# Patient Record
Sex: FEMALE | Race: WHITE
[De-identification: names, ages, dates, MRNs, and addresses within clinical notes are randomized per-mention and may not be internally consistent; named-entity substitution may affect disease eponyms.]

---

## 2017-07-14 ENCOUNTER — HOSPITAL ENCOUNTER (INPATIENT)
Dept: HOSPITAL 17 - PHED | Age: 72
LOS: 1 days | Discharge: HOME | DRG: 309 | End: 2017-07-15
Attending: HOSPITALIST | Admitting: HOSPITALIST
Payer: MEDICARE

## 2017-07-14 VITALS
OXYGEN SATURATION: 100 % | HEART RATE: 50 BPM | SYSTOLIC BLOOD PRESSURE: 103 MMHG | RESPIRATION RATE: 16 BRPM | DIASTOLIC BLOOD PRESSURE: 51 MMHG

## 2017-07-14 VITALS
DIASTOLIC BLOOD PRESSURE: 59 MMHG | RESPIRATION RATE: 16 BRPM | SYSTOLIC BLOOD PRESSURE: 91 MMHG | HEART RATE: 44 BPM | OXYGEN SATURATION: 98 %

## 2017-07-14 VITALS
OXYGEN SATURATION: 98 % | RESPIRATION RATE: 16 BRPM | SYSTOLIC BLOOD PRESSURE: 85 MMHG | DIASTOLIC BLOOD PRESSURE: 46 MMHG | TEMPERATURE: 97.6 F | HEART RATE: 35 BPM

## 2017-07-14 VITALS
HEART RATE: 48 BPM | RESPIRATION RATE: 16 BRPM | SYSTOLIC BLOOD PRESSURE: 86 MMHG | DIASTOLIC BLOOD PRESSURE: 54 MMHG | OXYGEN SATURATION: 94 %

## 2017-07-14 VITALS
OXYGEN SATURATION: 98 % | DIASTOLIC BLOOD PRESSURE: 44 MMHG | HEART RATE: 44 BPM | RESPIRATION RATE: 18 BRPM | SYSTOLIC BLOOD PRESSURE: 93 MMHG

## 2017-07-14 VITALS
DIASTOLIC BLOOD PRESSURE: 44 MMHG | HEART RATE: 46 BPM | OXYGEN SATURATION: 98 % | RESPIRATION RATE: 14 BRPM | SYSTOLIC BLOOD PRESSURE: 93 MMHG

## 2017-07-14 VITALS — BODY MASS INDEX: 28.87 KG/M2 | HEIGHT: 64 IN | WEIGHT: 169.09 LBS

## 2017-07-14 VITALS
HEART RATE: 35 BPM | RESPIRATION RATE: 16 BRPM | DIASTOLIC BLOOD PRESSURE: 43 MMHG | SYSTOLIC BLOOD PRESSURE: 79 MMHG | OXYGEN SATURATION: 100 %

## 2017-07-14 VITALS
DIASTOLIC BLOOD PRESSURE: 47 MMHG | OXYGEN SATURATION: 99 % | RESPIRATION RATE: 14 BRPM | HEART RATE: 46 BPM | SYSTOLIC BLOOD PRESSURE: 94 MMHG

## 2017-07-14 VITALS
HEART RATE: 48 BPM | SYSTOLIC BLOOD PRESSURE: 107 MMHG | TEMPERATURE: 98 F | RESPIRATION RATE: 12 BRPM | DIASTOLIC BLOOD PRESSURE: 63 MMHG | OXYGEN SATURATION: 96 %

## 2017-07-14 VITALS
DIASTOLIC BLOOD PRESSURE: 47 MMHG | RESPIRATION RATE: 14 BRPM | SYSTOLIC BLOOD PRESSURE: 80 MMHG | OXYGEN SATURATION: 98 % | HEART RATE: 44 BPM

## 2017-07-14 VITALS
SYSTOLIC BLOOD PRESSURE: 97 MMHG | RESPIRATION RATE: 14 BRPM | HEART RATE: 40 BPM | DIASTOLIC BLOOD PRESSURE: 48 MMHG | OXYGEN SATURATION: 97 %

## 2017-07-14 VITALS — OXYGEN SATURATION: 95 %

## 2017-07-14 VITALS
HEART RATE: 48 BPM | OXYGEN SATURATION: 96 % | TEMPERATURE: 97.7 F | DIASTOLIC BLOOD PRESSURE: 49 MMHG | SYSTOLIC BLOOD PRESSURE: 113 MMHG | RESPIRATION RATE: 16 BRPM

## 2017-07-14 VITALS
DIASTOLIC BLOOD PRESSURE: 60 MMHG | OXYGEN SATURATION: 98 % | SYSTOLIC BLOOD PRESSURE: 108 MMHG | RESPIRATION RATE: 14 BRPM | HEART RATE: 48 BPM

## 2017-07-14 VITALS
SYSTOLIC BLOOD PRESSURE: 115 MMHG | DIASTOLIC BLOOD PRESSURE: 52 MMHG | RESPIRATION RATE: 16 BRPM | OXYGEN SATURATION: 96 % | HEART RATE: 46 BPM

## 2017-07-14 DIAGNOSIS — I10: ICD-10-CM

## 2017-07-14 DIAGNOSIS — D64.9: ICD-10-CM

## 2017-07-14 DIAGNOSIS — N17.9: ICD-10-CM

## 2017-07-14 DIAGNOSIS — R73.9: ICD-10-CM

## 2017-07-14 DIAGNOSIS — E86.0: ICD-10-CM

## 2017-07-14 DIAGNOSIS — E66.9: ICD-10-CM

## 2017-07-14 DIAGNOSIS — I95.9: ICD-10-CM

## 2017-07-14 DIAGNOSIS — R25.1: ICD-10-CM

## 2017-07-14 DIAGNOSIS — Z98.84: ICD-10-CM

## 2017-07-14 DIAGNOSIS — Z79.82: ICD-10-CM

## 2017-07-14 DIAGNOSIS — R00.1: Primary | ICD-10-CM

## 2017-07-14 DIAGNOSIS — K22.70: ICD-10-CM

## 2017-07-14 LAB
ALP SERPL-CCNC: 142 U/L (ref 45–117)
ALT SERPL-CCNC: 53 U/L (ref 10–53)
ANION GAP SERPL CALC-SCNC: 12 MEQ/L (ref 5–15)
APTT BLD: 25.1 SEC (ref 24.3–30.1)
AST SERPL-CCNC: 91 U/L (ref 15–37)
BASOPHILS # BLD AUTO: 0 TH/MM3 (ref 0–0.2)
BASOPHILS NFR BLD: 0.2 % (ref 0–2)
BILIRUB SERPL-MCNC: 0.8 MG/DL (ref 0.2–1)
BUN SERPL-MCNC: 32 MG/DL (ref 7–18)
CHLORIDE SERPL-SCNC: 106 MEQ/L (ref 98–107)
CK SERPL-CCNC: 70 U/L (ref 26–192)
CK SERPL-CCNC: 81 U/L (ref 26–192)
EOSINOPHIL # BLD: 0.2 TH/MM3 (ref 0–0.4)
EOSINOPHIL NFR BLD: 2.6 % (ref 0–4)
ERYTHROCYTE [DISTWIDTH] IN BLOOD BY AUTOMATED COUNT: 13.7 % (ref 11.6–17.2)
GFR SERPLBLD BASED ON 1.73 SQ M-ARVRAT: 26 ML/MIN (ref 89–?)
HCO3 BLD-SCNC: 20.3 MEQ/L (ref 21–32)
HCT VFR BLD CALC: 28.2 % (ref 35–46)
HEMO FLAGS: (no result)
INR PPP: 1 RATIO
LACTIC ACID GHOST: (no result)
LYMPHOCYTES # BLD AUTO: 0.8 TH/MM3 (ref 1–4.8)
LYMPHOCYTES NFR BLD AUTO: 9.8 % (ref 9–44)
MAGNESIUM SERPL-MCNC: 1.7 MG/DL (ref 1.5–2.5)
MCH RBC QN AUTO: 31.5 PG (ref 27–34)
MCHC RBC AUTO-ENTMCNC: 32.3 % (ref 32–36)
MCV RBC AUTO: 97.6 FL (ref 80–100)
MONOCYTES NFR BLD: 5.6 % (ref 0–8)
NEUTROPHILS # BLD AUTO: 7.2 TH/MM3 (ref 1.8–7.7)
NEUTROPHILS NFR BLD AUTO: 81.8 % (ref 16–70)
PLATELET # BLD: 295 TH/MM3 (ref 150–450)
POTASSIUM SERPL-SCNC: 5.1 MEQ/L (ref 3.5–5.1)
PROTHROMBIN TIME: 10.6 SEC (ref 9.8–11.6)
RBC # BLD AUTO: 2.89 MIL/MM3 (ref 4–5.3)
SODIUM SERPL-SCNC: 138 MEQ/L (ref 136–145)
WBC # BLD AUTO: 8.7 TH/MM3 (ref 4–11)

## 2017-07-14 PROCEDURE — 80053 COMPREHEN METABOLIC PANEL: CPT

## 2017-07-14 PROCEDURE — 85025 COMPLETE CBC W/AUTO DIFF WBC: CPT

## 2017-07-14 PROCEDURE — 96374 THER/PROPH/DIAG INJ IV PUSH: CPT

## 2017-07-14 PROCEDURE — 85730 THROMBOPLASTIN TIME PARTIAL: CPT

## 2017-07-14 PROCEDURE — 87040 BLOOD CULTURE FOR BACTERIA: CPT

## 2017-07-14 PROCEDURE — 83735 ASSAY OF MAGNESIUM: CPT

## 2017-07-14 PROCEDURE — 82550 ASSAY OF CK (CPK): CPT

## 2017-07-14 PROCEDURE — 93005 ELECTROCARDIOGRAM TRACING: CPT

## 2017-07-14 PROCEDURE — 83690 ASSAY OF LIPASE: CPT

## 2017-07-14 PROCEDURE — 96375 TX/PRO/DX INJ NEW DRUG ADDON: CPT

## 2017-07-14 PROCEDURE — 71010: CPT

## 2017-07-14 PROCEDURE — 83605 ASSAY OF LACTIC ACID: CPT

## 2017-07-14 PROCEDURE — 84484 ASSAY OF TROPONIN QUANT: CPT

## 2017-07-14 PROCEDURE — 85610 PROTHROMBIN TIME: CPT

## 2017-07-14 PROCEDURE — 76775 US EXAM ABDO BACK WALL LIM: CPT

## 2017-07-14 PROCEDURE — 83880 ASSAY OF NATRIURETIC PEPTIDE: CPT

## 2017-07-14 RX ADMIN — HEPARIN SODIUM SCH UNITS: 10000 INJECTION, SOLUTION INTRAVENOUS; SUBCUTANEOUS at 21:56

## 2017-07-14 RX ADMIN — STANDARDIZED SENNA CONCENTRATE AND DOCUSATE SODIUM SCH TAB: 8.6; 5 TABLET, FILM COATED ORAL at 21:00

## 2017-07-14 RX ADMIN — Medication SCH ML: at 21:00

## 2017-07-14 RX ADMIN — PHENYTOIN SODIUM SCH MLS/HR: 50 INJECTION INTRAMUSCULAR; INTRAVENOUS at 18:25

## 2017-07-14 NOTE — HHI.HP
cc:   India Red MD


__________________________________________________





[facility]


Service


[facility] Hospitalists


Primary Care Physician


India Red MD


Admission Diagnosis


Symptomatic Bradycardia


Diagnoses:  


Chief Complaint:  


dizziness


Travel History


International Travel<30 Days:  No


Contact w/Intl Traveler <30 Da:  No


Traveled to Known Affected Are:  No


History of Present Illness


This is a 71-year-old female with past medical history significant for 

protection presents to [facility] complaining of dizziness.  The 

patient states that she had been recently started on verapamil efforts to 

control her blood pressure.  The patient states that this morning she started 

feeling dizzy and sweating profusely.  The patient however denies any chest pain

, palpitations, fevers, does complain of chills.  Patient said that she had dry 

heaving this morning.  Otherwise denies dysuria, abdominal pain.  The patient 

states that laying down somewhat her dizziness improved and standing up makes 

it worse.  The patient was in the emergency department and evaluated.  Found to 

be profoundly bradycardic and as per ED physician report given IV atropine 

which improved the patient's heart rate, patient also was given IV fluids, 

glucagon and calcium channel blocker was being administered during this 

interview.  A shunt also was found to be hypotensive with a systolic blood 

pressure in the 80s which has improved after being given IV fluids.





Review of Systems


As per history of present illness, other systems reviewed by me and negative





Past Family Social History


Past Medical History


Hypertension


Leija's esophagus


Past Surgical History


Bunionectomy


Adenoidectomy


Blepharoplasty


Reported Medications





Last Impressions








Chest X-Ray 7/14/17 1621 Signed





Impressions: 





 Service Date/Time:  Friday, July 14, 2017 16:38 - CONCLUSION:  No acute 





 cardiopulmonary disease.     RITA Santillan MD 





Reviewed by me


Allergies:  


Coded Allergies:  


     No Known Allergies (Unverified , 7/14/17)


Active Ordered Medications





 Current Medications








 Medications


  (Trade)  Dose


 Ordered  Sig/Alyssa


 Route  Start Time


 Stop Time Status Last Admin


 


 Sodium Chloride 2


 ml  2 ml  UNSCH  PRN


 IVF  7/14/17 16:30


     


 


 


  (NS 1000 ml Inj)  1,000 ml @ 


 100 mls/hr  Q10H


 IV  7/14/17 17:25


     


 


 


  (NS Flush)  2 ml  UNSCH  PRN


 IV FLUSH  7/14/17 17:30


     


 


 


  (NS Flush)  2 ml  BID


 IV FLUSH  7/14/17 21:00


     


 


 


  (Tylenol)  650 mg  Q4H  PRN


 PO  7/14/17 18:00


     


 


 


  (Zofran Inj)  4 mg  Q6H  PRN


 IVP  7/14/17 18:00


     


 


 


  (Heparin Inj)  5,000 units  Q8HR


 SQ  7/14/17 22:00


     


 


 


  (Roseann-Colace)  1 tab  BID


 PO  7/14/17 21:00


     


 


 


 Magnesium


 Hydroxide 30 ml  30 ml  Q12HR  PRN


 PO  7/14/17 21:00


     


 


 


 Sodium Chloride  1,000 ml @ 


 999 mls/hr  BOLUS  ONCE


 IV  7/14/17 17:30


 7/14/17 18:30   


 


 


  ( ml Inj)  500 ml @ 


 500 mls/hr  BOLUS  ONCE


 IV  7/14/17 17:30


 7/14/17 18:29  7/14/17 17:30


 








Family History


Denies family history of hypertension.  Denies family history of heart disease 

or cancer.


Social History


The patient denies smoking him a patient states she drinks 3 glasses 1 daily.


Denies illicit drug use.


The patient is  and has 2 children.





Physical Exam


Vital Signs





 Vital Signs








  Date Time  Temp Pulse Resp B/P Pulse Ox O2 Delivery O2 Flow Rate FiO2


 


7/14/17 17:00  46 14 93/44 98 Nasal Cannula 2 


 


7/14/17 16:45  50 16 103/51 100 Nasal Cannula 2 


 


7/14/17 16:30  46 14 94/47 99 Nasal Cannula 4 


 


7/14/17 16:30     100 Nasal Cannula 4 


 


7/14/17 16:20  35      


 


7/14/17 16:15  44 18 93/44 98 Nasal Cannula 4 


 


7/14/17 16:00  35 16 79/43 100 Nasal Cannula 4 


 


7/14/17 15:52 97.6 35 16 85/46 98   








Physical Exam


GENERAL: This is a well-nourished, well-developed patient, in no apparent 

distress.


SKIN: No rashes, ecchymoses or lesions. Cool and dry.


HEAD: Atraumatic. Normocephalic. No temporal or scalp tenderness.


EYES: Pupils equal round and reactive. Extraocular motions intact. No scleral 

icterus. No injection or drainage. 


ENT: Nose without bleeding, purulent drainage or septal hematoma. Throat 

without erythema, tonsillar hypertrophy or exudate. Uvula midline. Airway 

patent.


NECK: Trachea midline. No JVD or lymphadenopathy. Supple, nontender, no 

meningeal signs.


CARDIOVASCULAR: Bradycardic, but her rate and rhythm, no murmur.  No gallops.


RESPIRATORY: Clear to auscultation. Breath sounds equal bilaterally. No wheezes

, rales, or rhonchi.  


GASTROINTESTINAL: Abdomen soft, non-tender, nondistended. No hepato-splenomegaly

, or palpable masses. No guarding.


MUSCULOSKELETAL: Extremities without clubbing, cyanosis, or edema. No joint 

tenderness, effusion, or edema noted. No calf tenderness. Negative Homans sign 

bilaterally.


NEUROLOGICAL: Awake and alert. Cranial nerves II through XII intact.  Motor and 

sensory grossly within normal limits. Five out of 5 muscle strength in all 

muscle groups.  Normal speech.


Laboratory





Laboratory Tests








Test 7/14/17





 16:20


 


White Blood Count 8.7 


 


Red Blood Count 2.89 


 


Hemoglobin 9.1 


 


Hematocrit 28.2 


 


Mean Corpuscular Volume 97.6 


 


Mean Corpuscular Hemoglobin 31.5 


 


Mean Corpuscular Hemoglobin 32.3 





Concent 


 


Red Cell Distribution Width 13.7 


 


Platelet Count 295 


 


Mean Platelet Volume 8.0 


 


Neutrophils (%) (Auto) 81.8 


 


Lymphocytes (%) (Auto) 9.8 


 


Monocytes (%) (Auto) 5.6 


 


Eosinophils (%) (Auto) 2.6 


 


Basophils (%) (Auto) 0.2 


 


Neutrophils # (Auto) 7.2 


 


Lymphocytes # (Auto) 0.8 


 


Monocytes # (Auto) 0.5 


 


Eosinophils # (Auto) 0.2 


 


Basophils # (Auto) 0.0 


 


CBC Comment DIFF FINAL 


 


Differential Comment  


 


Prothrombin Time 10.6 


 


Prothromb Time International 1.0 





Ratio 


 


Activated Partial 25.1 





Thromboplast Time 


 


Sodium Level 138 


 


Potassium Level 5.1 


 


Chloride Level 106 


 


Carbon Dioxide Level 20.3 


 


Anion Gap 12 


 


Blood Urea Nitrogen 32 


 


Creatinine 1.90 


 


Estimat Glomerular Filtration 26 





Rate 


 


Random Glucose 193 


 


Calcium Level 8.9 


 


Magnesium Level 1.7 


 


Total Bilirubin 0.8 


 


Aspartate Amino Transf 91 





(AST/SGOT) 


 


Alanine Aminotransferase 53 





(ALT/SGPT) 


 


B-Type Natriuretic Peptide 416 


 


Total Protein 7.2 


 


Albumin 3.6 


 


Lipase 435 








Result Diagram:  


7/14/17 1620                                                                   

             7/14/17 1620





Imaging





Last Impressions








Chest X-Ray 7/14/17 1621 Signed





Impressions: 





 Service Date/Time:  Friday, July 14, 2017 16:38 - CONCLUSION:  No acute 





 cardiopulmonary disease.     RITA Santillan MD 











Assessment and Plan


Problem List:  


(1) Symptomatic bradycardia


ICD Code:  R00.1


Status:  Acute


Plan:  Patient percent with dizziness and found to be bradycardic.


Status post glucagon, IV calcium gluconate and IV atropine with partial 

response of heart rate.


Admit the patient to intensive care unit the patient is hypotensive and 

bradycardic despite above-mentioned measures.


Atropine at bedside to be given for heart rate less than 35 or symptomatically 

bradycardia.


Cardiology consult - ED physician communicated with Dr. Swan who requested the 

patient to be transferred to the [facility] in [place].


Troponin negative, monitor cardiac enzymes serially.


EKG which was reviewed by me showed a ventricular bradycardia, no ST-T changes 

suggestive of active ischemia.





(2) Hypotension


ICD Code:  I95.9


Status:  Acute


Plan:  Patient still severely hypotensive and dehydrated on exam.  I will give 

1 L normal saline bolus IV once and continue with maintenance IV fluids at 150 

mL's per hour.


Hold all antihypertensive medications.





(3) BLESSING (acute kidney injury)


ICD Code:  N17.9


Status:  Acute


Plan:  Creatinine admission 1.9.  I do not have any previous labs to compare 

with.  Continue IV fluids and continue to monitor vital signs.


Check renal ultrasound and insert Krishnan catheter for strict I's and O's.





(4) Dehydration


ICD Code:  E86.0


Status:  Acute


(5) Anemia


ICD Code:  D64.9


Status:  Acute


Plan:  Patient has history of gastric bypass and she has to get IV iron 

infusions every year.  We'll order iron studies and replace if needed.


If iron is required then the patient will most likely to get IV iron since oral 

iron is likely not absorbed.





(6) Hyperglycemia


ICD Code:  R73.9


Status:  Acute


Plan:  No previous history of diabetes.  Hypoglycemia could be likely secondary 

to glucagon administration.  I will order a hemoglobin A1c.


In the meantime I will place on SSI coverage with insulin NovoLog, monitor Accu-

Cheks.





Assessment and Plan


GI prophylaxis: Continue PPI.


DVT prophylaxis: We'll start on hypertensive potassium place on SCDs.


Code Status


Full code


Discussed Condition With


Patient, ED physician.





Physician Certification


2 Midnight Certification Type:  Admission for Inpatient Services


Order for Inpatient Services


The services are ordered in accordance with Medicare regulations or non-

Medicare payer requirements, as applicable.  In the case of services not 

specified as inpatient-only, they are appropriately provided as inpatient 

services in accordance with the 2-midnight benchmark.


Estimated LOS (days):  2


 days is the estimated time the patient will need to remain in the hospital, 

assuming treatment plan goals are met and no additional complications.


Post-Hospital Plan:  Not yet determined





Problem Qualifiers





(1) Hypotension:  


Qualified Code:  I95.9 - Hypotension, unspecified hypotension type


(2) Anemia:  


Qualified Code:  D64.9 - Anemia, unspecified type





Abrahan Min MD Jul 14, 2017 17:29

## 2017-07-14 NOTE — RADRPT
EXAM DATE/TIME:  07/14/2017 20:10 

 

[facility] COMPARISON:     

No previous studies available for comparison.

        

 

 

INDICATIONS :     

Increased BUN/Creatinine.

                     

 

MEDICAL HISTORY :     

Hypertension.     Leija's esophagus. bradycardia.

 

SURGICAL HISTORY :          

Bunionectomy. Adenoidectomy. Blepharoplasty.

 

ENCOUNTER:     

Initial

 

ACUITY:     

1 day

 

PAIN SCORE:     

0/10

 

LOCATION:     

Bilateral flank 

MEASUREMENTS:     

 

RIGHT KIDNEY:     

11.6 x 6.0 x 5.8 cm

 

LEFT KIDNEY:     

11.4 x 6.4 x 5.6 cm

 

FINDINGS:     

Increased parenchymal echogenicity seen of both kidneys. No hydronephrosis. Trace perinephric fluid s
een on the left, etiology uncertain.

 

Urinary bladder nondistended and grossly unremarkable.

 

CONCLUSION:     

Bilateral echogenic kidneys typical of chronic parenchymal disease. No evidence of obstructive uropat
hy. Trace nonspecific perinephric fluid on the left.

 

 

 

 Tip Londono MD on July 14, 2017 at 21:04           

Board Certified Radiologist.

 This report was verified electronically.

## 2017-07-14 NOTE — PD
HPI


Chief Complaint:  Dizziness


Time Seen by Provider:  16:00


Travel History


International Travel<30 days:  No


Contact w/Intl Traveler<30days:  No


Traveled to known affect area:  No





History of Present Illness


HPI


Patient is a 71-year-old female who presents to emergency room complaints of 

lightheadedness, dizziness, diaphoresis.  Patient reports that symptoms began 1 

ago after she came home from her exercise class. Patient reports that she was 

getting ready to walk her dog when all of a sudden, she became lightheaded and 

dizzy. Reports that she became diaphoretic and became nauseous and vomited x 1.

  Reports that she has seen her pcp recently - reports that her medications 

were changed from metoprolol to verapamil 120 mg and she started these 

medications 2 days ago.  Patient reports that she also takes propanolol 120 mg, 

lisinopril 10 mg, amlodipine 5 mg, atorvastatin 40 mg.  Reports that the 

metoprolol made her left leg swell too much so her medication.





PFSH


Past Medical History


Gastrointestinal Disorders:  Yes (Leija's esophagus )


Hypertension:  Yes


Tetanus Vaccination:  > 5 Years


Influenza Vaccination:  No


Pregnant?:  Not Pregnant


Menopausal:  Yes





Past Surgical History


Other Surgery:  Yes (Adenoids)





Social History


Alcohol Use:  Yes (Wine daily )


Tobacco Use:  No


Substance Use:  No





Allergies-Medications


(Allergen,Severity, Reaction):  


Coded Allergies:  


     No Known Allergies (Unverified , 7/14/17)


Reported Meds & Prescriptions





Reported Meds & Active Scripts


Active


Reported


Verapamil ER (Verapamil HCl) 120 Mg Tab 120 Mg PO DAILY


Aspirin 81 (Aspirin) 81 Mg Tabdr 81 Mg PO DAILY


Lansoprazole 30 Mg Capdr 30 Mg PO DAILY


Propranolol ER 24 HR (Propranolol HCl) 120 Mg Cap 120 Mg PO DAILY


Lisinopril 10 Mg Tab 10 Mg PO DAILY


Amlodipine (Amlodipine Besylate) 5 Mg Tab 5 Mg PO DAILY


Lipitor (Atorvastatin Calcium) 40 Mg Tab 40 Mg PO HS








Review of Systems


General / Constitutional:  No: Fever


Eyes:  No: Visual changes


HENT:  No: Headaches


Cardiovascular:  Positive: Irregular Rhythm, Diaphoresis,  No: Chest Pain or 

Discomfort


Respiratory:  No: Shortness of Breath


Gastrointestinal:  Positive: Nausea, Vomiting,  No: Abdominal Pain


Genitourinary:  No: Dysuria


Musculoskeletal:  No: Pain


Skin:  No Rash


Neurologic:  No: Weakness


Psychiatric:  No: Depression


Endocrine:  No: Polydipsia


Hematologic/Lymphatic:  No: Easy Bruising





Physical Exam


Narrative


GENERAL: Severe distress


SKIN: Focused skin assessment warm/dry.


HEAD: Atraumatic. Normocephalic. 


EYES: Pupils equal and round. No scleral icterus. No injection or drainage. 


ENT: No nasal bleeding or discharge.  Mucous membranes pink and moist.


NECK: Trachea midline. No JVD. 


CARDIOVASCULAR: Profound bradycardia.  No murmur appreciated.


RESPIRATORY: No accessory muscle use. Clear to auscultation. Breath sounds 

equal bilaterally. 


GASTROINTESTINAL: Abdomen soft, non-tender, nondistended. Hepatic and splenic 

margins not palpable. 


MUSCULOSKELETAL: No obvious deformities. No clubbing.  No cyanosis.  No edema. 


NEUROLOGICAL: Awake and alert. No obvious cranial nerve deficits.  Motor 

grossly within normal limits. Normal speech.


PSYCHIATRIC: Appropriate mood and affect; insight and judgment normal.





Data


Data


Last Documented VS





Vital Signs








  Date Time  Temp Pulse Resp B/P Pulse Ox O2 Delivery O2 Flow Rate FiO2


 


7/14/17 17:00  46 14 93/44 98 Nasal Cannula 2 


 


7/14/17 15:52 97.6       








Orders





 Atropine Inj (Atropine Inj) (7/14/17 16:11)


Electrocardiogram (7/14/17 16:21)


B-Type Natriuretic Peptide (7/14/17 16:21)


Ckmb (Isoenzyme) Profile (7/14/17 16:21)


Complete Blood Count With Diff (7/14/17 16:21)


Comprehensive Metabolic Panel (7/14/17 16:21)


Magnesium (Mg) (7/14/17 16:21)


Prothrombin Time / Inr (Pt) (7/14/17 16:21)


Act Partial Throm Time (Ptt) (7/14/17 16:21)


Troponin I (7/14/17 16:21)


Lipase (7/14/17 16:21)


Chest, Single Ap (7/14/17 16:21)


Ecg Monitoring (7/14/17 16:21)


Iv Access Insert/Monitor (7/14/17 16:21)


Oximetry (7/14/17 16:21)


Sodium Chloride 0.9% Flush (Ns Flush) (7/14/17 16:30)


Bedside Glucose KEMAL.AC&HS (7/14/17 16:21)


Atropine Inj (Atropine Inj) (7/14/17 16:30)


Sodium Chlor 0.9% 1000 Ml Inj (Ns 1000 M (7/14/17 16:30)


Sodium Chlor 0.9% 1000 Ml Inj (Ns 1000 M (7/14/17 16:30)


Calcium Gluconate Inj (Calcium Gluconate (7/14/17 16:45)


Glucagon Inj (Glucagon Inj) (7/14/17 16:45)


Consult Cardiology (7/14/17 )


Lactic Acid Sepsis Protocol (7/14/17 16:42)


Blood Culture (7/14/17 16:42)


Urinalysis - C+S If Indicated (7/14/17 16:42)


Admit Order (Ed Use Only) (7/14/17 17:06)





Labs





 Laboratory Tests








Test 7/14/17





 16:20


 


White Blood Count 8.7 TH/MM3


 


Red Blood Count 2.89 MIL/MM3


 


Hemoglobin 9.1 GM/DL


 


Hematocrit 28.2 %


 


Mean Corpuscular Volume 97.6 FL


 


Mean Corpuscular Hemoglobin 31.5 PG


 


Mean Corpuscular Hemoglobin 32.3 %





Concent 


 


Red Cell Distribution Width 13.7 %


 


Platelet Count 295 TH/MM3


 


Mean Platelet Volume 8.0 FL


 


Neutrophils (%) (Auto) 81.8 %


 


Lymphocytes (%) (Auto) 9.8 %


 


Monocytes (%) (Auto) 5.6 %


 


Eosinophils (%) (Auto) 2.6 %


 


Basophils (%) (Auto) 0.2 %


 


Neutrophils # (Auto) 7.2 TH/MM3


 


Lymphocytes # (Auto) 0.8 TH/MM3


 


Monocytes # (Auto) 0.5 TH/MM3


 


Eosinophils # (Auto) 0.2 TH/MM3


 


Basophils # (Auto) 0.0 TH/MM3


 


CBC Comment DIFF FINAL 


 


Differential Comment  


 


Prothrombin Time 10.6 SEC


 


Prothromb Time International 1.0 RATIO





Ratio 


 


Activated Partial 25.1 SEC





Thromboplast Time 


 


Sodium Level 138 MEQ/L


 


Potassium Level 5.1 MEQ/L


 


Chloride Level 106 MEQ/L


 


Carbon Dioxide Level 20.3 MEQ/L


 


Anion Gap 12 MEQ/L


 


Blood Urea Nitrogen 32 MG/DL


 


Creatinine 1.90 MG/DL


 


Estimat Glomerular Filtration 26 ML/MIN





Rate 


 


Random Glucose 193 MG/DL


 


Calcium Level 8.9 MG/DL


 


Magnesium Level 1.7 MG/DL


 


Total Bilirubin 0.8 MG/DL


 


Aspartate Amino Transf 91 U/L





(AST/SGOT) 


 


Alanine Aminotransferase 53 U/L





(ALT/SGPT) 


 


Alkaline Phosphatase 142 U/L


 


Total Creatine Kinase 81 U/L


 


Troponin I LESS THAN 0.02





 NG/ML


 


B-Type Natriuretic Peptide 416 PG/ML


 


Total Protein 7.2 GM/DL


 


Albumin 3.6 GM/DL


 


Lipase 435 U/L











MDM


Medical Decision Making


Medical Screen Exam Complete:  Yes


Emergency Medical Condition:  Yes


Interpretation(s)


EKG at 1605: Bradycardia at 35 beats for minute, QT/QTc 498/399, no acute ST or 

T-wave changes








Vital Signs








  Date Time  Temp Pulse Resp B/P Pulse Ox O2 Delivery O2 Flow Rate FiO2


 


7/14/17 16:30     100 Nasal Cannula 4 


 


7/14/17 16:20  35      


 


7/14/17 15:52 97.6 35 16 85/46 98   








Differential Diagnosis


Differential includes arrhythmia, beta blocker overdose, calcium channel 

blocker overdose, sepsis, dehydration, electrolyte abnormality


Narrative Course


Patient is a 71-year-old female who presents to emergency room with complaints 

of diaphoresis, nausea vomiting and lightheadedness.  Symptoms began one hour 

after she came home from her workout today.  





Upon arrival to emergency room, patient was hypotensive with a blood pressure 

of 85/46, heart rate was 35.  Patient was diaphoretic on initial exam.  Patient 

was placed on a cardiac monitor as well as continuous pulse oximeter.  Patient 

was given 1mg IV atropine which brought her heart rate up to 45-50.  .  





Concern for possible beta blocker overdose (though patient last took metoprolol 

2 days ago) vs calcium channel overdose





Patient responded to atropine 1mg and HR now 50, patient is no longer 

diaphoretic at this time, reports that she is feeling better.





Patient was given dose of IV glucagon to see if there is response for 

consideration of beta blocker overdose, heart rate remains in the 50 with 

glucagon.  BP now 103/51





Patient was then given 1 dose of IV Calcium gluconate at there is consideration 

for possible calcium channel overdose - HR 44-45 and BP 91/59 





Case was reviewed with Dr. Swan with cardiology - agrees with plan of care to 

transfer to [facility] as she may need further aggressive treatment for her 

bradycardia





Case reviewed with Dr. Pina who accepts pt to CIC at [facility]





CBC & BMP Diagram


7/14/17 16:20








patient BUN/CR 32/1.90 - patients renal function elevated most likely from 

dehydration, she is responding to IVF at this time. 





on re-evaluation, HR now 35, bp 80/53 - patient did not respond to glucagon or 

calcium gluconate.  Patient was given another dose of atropine 1mg, HR now in 

the 40's and sbp now 97/48





case reviewed with Dr. Mcintosh - will upgrade to ICU


Critical Care Narrative


Aggregate critical care time was 60 minutes. Time to perform other separately 

billable procedures was not  


included in the critical care time. My time did not include minutes spent 

treating any other patients simultaneously or on  


activities that did not directly contribute to the patient's treatment.  





The services I provided to this patient were to treat and/or prevent clinically 

significant deterioration that could result  


in:  death, decompensation, deterioration





I provided critical care services requiring my management, as noted below:


Chart data review, documentation time, medication orders and management, vital 

sign assessments/reviewing monitor data,  


ordering and reviewing lab tests, ordering and interpreting/reviewing x-rays 

and diagnostic studies, care of the patient  


and discussion of the patient with the admitting physicians.





Diagnosis





 Primary Impression:  


 Bradycardia with 31-40 beats per minute


 Additional Impressions:  


 Bradycardia, drug induced


 Dehydration


 Anemia





Admitting Information


Admitting Physician Requests:  Lucretia Orozco DO Jul 14, 2017 16:32

## 2017-07-14 NOTE — RADRPT
EXAM DATE/TIME:  07/14/2017 16:38 

 

[facility] COMPARISON:     

No previous studies available for comparison.

 

                     

INDICATIONS :     

Syncope. 

                     

 

MEDICAL HISTORY :     

None.          

 

SURGICAL HISTORY :     

None.   

 

ENCOUNTER:     

Initial                                        

 

ACUITY:     

1 day      

 

PAIN SCORE:     

0/10

 

LOCATION:     

Bilateral chest 

 

FINDINGS:     

The lungs are clear without infiltrate, nodule, or mass.  There is no appreciable pleural effusion fo
r technique.  Heart and mediastinum are unremarkable.

 

CONCLUSION:         

No acute cardiopulmonary disease.

 

 

 

 RITA Santillan MD on July 14, 2017 at 16:50           

Board Certified Radiologist.

 This report was verified electronically.

## 2017-07-15 VITALS — OXYGEN SATURATION: 95 %

## 2017-07-15 VITALS
DIASTOLIC BLOOD PRESSURE: 64 MMHG | HEART RATE: 65 BPM | OXYGEN SATURATION: 94 % | SYSTOLIC BLOOD PRESSURE: 120 MMHG | TEMPERATURE: 98.1 F | RESPIRATION RATE: 12 BRPM

## 2017-07-15 VITALS
RESPIRATION RATE: 16 BRPM | OXYGEN SATURATION: 94 % | SYSTOLIC BLOOD PRESSURE: 141 MMHG | TEMPERATURE: 98.5 F | HEART RATE: 72 BPM | DIASTOLIC BLOOD PRESSURE: 67 MMHG

## 2017-07-15 VITALS
DIASTOLIC BLOOD PRESSURE: 69 MMHG | OXYGEN SATURATION: 92 % | TEMPERATURE: 98.6 F | HEART RATE: 73 BPM | RESPIRATION RATE: 16 BRPM | SYSTOLIC BLOOD PRESSURE: 130 MMHG

## 2017-07-15 LAB
ALP SERPL-CCNC: 120 U/L (ref 45–117)
ALT SERPL-CCNC: 74 U/L (ref 10–53)
ANION GAP SERPL CALC-SCNC: 11 MEQ/L (ref 5–15)
AST SERPL-CCNC: 75 U/L (ref 15–37)
BASOPHILS # BLD AUTO: 0 TH/MM3 (ref 0–0.2)
BASOPHILS NFR BLD: 0.2 % (ref 0–2)
BILIRUB SERPL-MCNC: 0.7 MG/DL (ref 0.2–1)
BUN SERPL-MCNC: 31 MG/DL (ref 7–18)
CHLORIDE SERPL-SCNC: 110 MEQ/L (ref 98–107)
CK SERPL-CCNC: 61 U/L (ref 26–192)
EOSINOPHIL # BLD: 0 TH/MM3 (ref 0–0.4)
EOSINOPHIL NFR BLD: 0.1 % (ref 0–4)
ERYTHROCYTE [DISTWIDTH] IN BLOOD BY AUTOMATED COUNT: 14.1 % (ref 11.6–17.2)
GFR SERPLBLD BASED ON 1.73 SQ M-ARVRAT: 35 ML/MIN (ref 89–?)
HCO3 BLD-SCNC: 18.4 MEQ/L (ref 21–32)
HCT VFR BLD CALC: 28.4 % (ref 35–46)
HEMO FLAGS: (no result)
LYMPHOCYTES # BLD AUTO: 0.6 TH/MM3 (ref 1–4.8)
LYMPHOCYTES NFR BLD AUTO: 7.7 % (ref 9–44)
MCH RBC QN AUTO: 32.8 PG (ref 27–34)
MCHC RBC AUTO-ENTMCNC: 32.8 % (ref 32–36)
MCV RBC AUTO: 100 FL (ref 80–100)
MONOCYTES NFR BLD: 8.1 % (ref 0–8)
NEUTROPHILS # BLD AUTO: 6.7 TH/MM3 (ref 1.8–7.7)
NEUTROPHILS NFR BLD AUTO: 83.9 % (ref 16–70)
PLATELET # BLD: 244 TH/MM3 (ref 150–450)
POTASSIUM SERPL-SCNC: 4.7 MEQ/L (ref 3.5–5.1)
RBC # BLD AUTO: 2.84 MIL/MM3 (ref 4–5.3)
SODIUM SERPL-SCNC: 139 MEQ/L (ref 136–145)
WBC # BLD AUTO: 8 TH/MM3 (ref 4–11)

## 2017-07-15 RX ADMIN — STANDARDIZED SENNA CONCENTRATE AND DOCUSATE SODIUM SCH TAB: 8.6; 5 TABLET, FILM COATED ORAL at 08:50

## 2017-07-15 RX ADMIN — HEPARIN SODIUM SCH UNITS: 10000 INJECTION, SOLUTION INTRAVENOUS; SUBCUTANEOUS at 05:31

## 2017-07-15 RX ADMIN — Medication SCH ML: at 08:51

## 2017-07-15 RX ADMIN — PHENYTOIN SODIUM SCH MLS/HR: 50 INJECTION INTRAMUSCULAR; INTRAVENOUS at 03:25

## 2017-07-15 NOTE — HHI.PR
Subjective


Remarks


Follow-up symptomatic bradycardia. She states that she feels much better today. 

Denies chest pain, lightheadedness, dizziness. Denies dyspnea, cough. She 

believes that her symptoms were related to medication change. She was 

apparently switched to verapamil a couple days ago.





Objective


Vitals





 Vital Signs








  Date Time  Temp Pulse Resp B/P Pulse Ox O2 Delivery O2 Flow Rate FiO2


 


7/15/17 07:00  73      


 


7/15/17 07:00 98.6 73 16 130/69 92   


 


7/15/17 03:00 98.1 65 12 120/64 94   


 


7/15/17 03:00  65      


 


7/14/17 23:00  43      


 


7/14/17 23:00 98.0 48 12 107/63 96   


 


7/14/17 22:07     95 Nasal Cannula 2.00 


 


7/14/17 19:30  48      


 


7/14/17 19:30 97.7 48 16 113/49 96   


 


7/14/17 18:45  48 14 108/60 98 Nasal Cannula 2 


 


7/14/17 18:30  46 16 115/52 96 Nasal Cannula 2 


 


7/14/17 18:15  48 16 86/54 94 Nasal Cannula 2 


 


7/14/17 18:00  40 14 97/48 97 Nasal Cannula 2 


 


7/14/17 17:45  44 14 80/47 98 Nasal Cannula 2 


 


7/14/17 17:30  44 16 91/59 98 Nasal Cannula 2 


 


7/14/17 17:00  46 14 93/44 98 Nasal Cannula 2 


 


7/14/17 16:45  50 16 103/51 100 Nasal Cannula 2 


 


7/14/17 16:30  46 14 94/47 99 Nasal Cannula 4 


 


7/14/17 16:30     100 Nasal Cannula 4 


 


7/14/17 16:20  35      


 


7/14/17 16:15  44 18 93/44 98 Nasal Cannula 4 


 


7/14/17 16:00  35 16 79/43 100 Nasal Cannula 4 


 


7/14/17 15:52 97.6 35 16 85/46 98   








 I/O








 7/14/17 7/14/17 7/14/17 7/15/17 7/15/17 7/15/17





 07:00 15:00 23:00 07:00 15:00 23:00


 


Intake Total   2500 ml 1700 ml  


 


Output Total    300 ml  


 


Balance   2500 ml 1400 ml  


 


      


 


Intake Oral    720 ml  


 


IV Total   2500 ml 980 ml  


 


Output Urine Total    300 ml  


 


# Bowel Movements    0  








Result Diagram:  


7/15/17 0551                                                                   

             7/15/17 0600





Imaging





Last Impressions








Chest X-Ray 7/14/17 1621 Signed





Impressions: 





 Service Date/Time:  Friday, July 14, 2017 16:38 - CONCLUSION:  No acute 





 cardiopulmonary disease.     RITA Santillan MD 


 


Renal Ultrasound 7/14/17 0000 Signed





Impressions: 





 Service Date/Time:  Friday, July 14, 2017 20:10 - CONCLUSION:  Bilateral 





 echogenic kidneys typical of chronic parenchymal disease. No evidence of 





 obstructive uropathy. Trace nonspecific perinephric fluid on the left.     

Tip Londono MD 








Objective Remarks


General: Elderly female in no acute distress.


Heart: Regular rate and rhythm. No murmur.


Lungs: Clear to auscultation bilaterally. No wheezes, rales, or rhonchi. 

Breathing is nonlabored.


Abdomen: Soft, nontender, nondistended.


Extremities: No lower extremity edema.


Psych: Alert and oriented.


Procedures


None


Urinary Catheter:  No


Vascular Central Line Catheter:  No





A/P


Problem List:  


(1) Symptomatic bradycardia


ICD Code:  R00.1


Status:  Acute


(2) Hypotension


ICD Code:  I95.9


Status:  Resolved


(3) BLESSING (acute kidney injury)


ICD Code:  N17.9


Status:  Acute


(4) Dehydration


ICD Code:  E86.0


Status:  Acute


(5) Anemia


ICD Code:  D64.9


Status:  Acute


(6) Hyperglycemia


ICD Code:  R73.9


Status:  Acute


Assessment and Plan


1. Symptomatic bradycardia: Patient initially presented with dizziness and was 

found to have heart rate in the 30s. She received glucagon, IV calcium gluconate

, and IV atropine. Grzegorz sinus rhythm, rate in the 70s. Blood pressure is much 

improved as well. Cardiology consultation is pending.


2. Hypotension: Likely secondary to dehydration. Blood pressure has improved 

with IV fluid hydration.


3. Acute kidney injury: Secondary to dehydration. Creatinine improving. Renal 

ultrasound shows no evidence of obstructive uropathy. Bilateral kidneys have 

appearance typical of chronic parenchymal disease. Patient denies history of 

chronic kidney disease.


4. Anemia: H&H stable overnight.


5. Hyperglycemia: Improved.


6. GI prophylaxis: PPI.


7. DVT prophylaxis: SCDs. Patient refusing heparin.


8. Elevated LFTs: Check ultrasound of the liver. Follow labs.





Problem Qualifiers





(1) Hypotension:  


Qualified Code:  I95.9 - Hypotension, unspecified hypotension type


(2) Anemia:  


Qualified Code:  D64.9 - Anemia, unspecified type





Franklin Dahl MD Jul 15, 2017 09:58

## 2017-07-15 NOTE — HHI.DCPOC
Discharge Care Plan


Diagnosis:  


(1) Bradycardia, drug induced


(2) Dehydration


(3) BLESSING (acute kidney injury)


(4) Anemia


(5) Symptomatic bradycardia


(6) Hyperglycemia


(7) Hypotension


Goals to Promote Your Health


* To prevent worsening of your condition and complications


* To maintain your health at the optimal level


Directions to Meet Your Goals


*** Take your medications as prescribed


*** Follow your dietary instruction


*** Follow activity as directed








*** Keep your appointments as scheduled


*** Take your immunizations and boosters as scheduled


*** If your symptoms worsen call your PCP, if no PCP go to Urgent Care Center 

or Emergency Room***


*** Smoking is Dangerous to Your Health. Avoid second hand smoke***


***Call the 24-hour hour crisis hotline for domestic abuse at 1-321.608.9370***








Franklin Dahl MD Jul 15, 2017 13:18

## 2017-07-15 NOTE — EKG
Date Performed: 07/14/2017       Time Performed: 23:33:50

 

PTAGE:      71 years

 

EKG:      Probable junctional rhythm. Prolonged QT interval Septal ST-T changes are nonspecific Low Q
RS voltages in precordial leads Compared to prior tracing no significant change Abnormal ECG

 

PREVIOUS TRACING       : 07/14/2017 16.05

 

DOCTOR:   Mello Kaur  Interpretating Date/Time  07/15/2017 13:50:17

## 2017-07-15 NOTE — EKG
Date Performed: 07/15/2017       Time Performed: 05:48:06

 

PTAGE:      71 years

 

EKG:      Sinus rhythm 

 

 rSr'(V1) - probable normal variant Compared to previous tracing, sinus rhythm has replaced junctiona
l bradycardia Normal ECG

 

PREVIOUS TRACING       : 07/14/2017 23.33

 

DOCTOR:   Mello Kaur  Interpretating Date/Time  07/15/2017 13:54:02

## 2017-07-15 NOTE — MB
cc:

SUSANA BORRERO MD

****

 

 

DATE OF CONSULTATION:  07/15/2017.

 

REASON FOR CONSULTATION:

Symptomatic bradycardia.

 

HISTORY OF PRESENT ILLNESS:

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

history of hypertension and tremor for which she takes propranolol currently.

Verapamil was recently started.  She only had a couple of doses of this

verapamil where she began feeling sweaty, nauseous and lightheaded.  She

presented to the emergency department and was found to be in a junctional

bradycardia at about 36 beats per minute initially.  She was admitted to the

intensive care unit and hydrated and as her medications have worn off, she has

converted back to a normal sinus rhythm. She is completely asymptomatic and

asking to be discharged home.

 

She denies chest pain, shortness of breath, any residual lightheadedness or

dizziness and she had no syncopal episode with this event.

 

PAST MEDICAL HISTORY:

1. Hypertension.

2. Obesity.

 

CURRENT MEDICATIONS:

Aspirin 81 milligrams daily.

 

ALLERGIES:

NO KNOWN DRUG ALLERGIES.

 

PHYSICAL EXAMINATION:

VITAL SIGNS: Afebrile, pulse 73, respiratory rate 15, blood pressure 130/69

satting 92% on two liters.

GENERAL:  In general, a pleasant obese woman in no distress.

NECK: No jugular venous distention.

LUNGS: Clear to auscultation bilaterally.

CARDIOVASCULAR: Regular rate and rhythm. No murmurs appreciated.

ABDOMEN: Benign.

EXTREMITIES: No edema.

 

 

LABORATORY DATA:

White count 8.0, hematocrit 28.4, platelet count 244,000.

 

Sodium 139, potassium 4.7, chloride 100, bicarb 18.4, BUN 31, creatinine 1.45.

 

Cardiac enzymes are negative x2.

 

Liver enzymes are mildly elevated.

 

BNP is mildly elevated.

 

IMAGING STUDIES:

Chest x-ray shows no acute disease.

 

EKGS:

Initial EKG showed a junctional rhythm at 36 with nonspecific changes.

 

Current EKG and telemetry shows sinus rhythm in the 60s and 70s.

 

IMPRESSION:

Junctional rhythm.

 

The patient in the setting of a new medication had what appears to be

symptomatic junctional rhythm after administration of Glucagon and Atropine.

This has resolved and she is in a normal rhythm and asymptomatic.

 

I did advise that she have further observation while the hospital but she

wishes to go home with outpatient followup.  She says she will not take any

further propranolol or verapamil.  Again, she is a retired nurse and feels

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

outpatient.  Though this is not my first choice, I suppose it is reasonable

given all the above, and I will see her in my office next week.

 

She does understand she takes some increased risks by not participating in

further observation.

 

Thank you again for the opportunity to participate in this patient's care.

 

                              _________________________________

                              MD JEREMY Martinez/TIM

D:  7/15/2017/12:43 PM

T:  7/15/2017/3:34 PM

Visit #:  [id number]

Job #:  [id number]

## 2017-07-15 NOTE — EKG
Date Performed: 07/14/2017       Time Performed: 16:05:59

 

PTAGE:      71 years

 

EKG:      JUNCTIONAL BRADYCARDIA LOW QRS VOLTAGE IN PRECORDIAL LEADS ABNORMAL RHYTHM ECG INTERPRETATI
ON BASED ON A DEFAULT AGE OF 39 YEARS 

 

NO PREVIOUS TRACING            

 

DOCTOR:   Mello Kaur  Interpretating Date/Time  07/15/2017 13:50:09

## 2017-07-18 ENCOUNTER — HOSPITAL ENCOUNTER (OUTPATIENT)
Dept: HOSPITAL 17 - PLAB | Age: 72
End: 2017-07-18
Attending: FAMILY MEDICINE
Payer: MEDICARE

## 2017-07-18 DIAGNOSIS — N17.9: Primary | ICD-10-CM

## 2017-07-18 DIAGNOSIS — R79.89: ICD-10-CM

## 2017-07-18 DIAGNOSIS — N18.4: ICD-10-CM

## 2017-07-18 LAB
ALP SERPL-CCNC: 123 U/L (ref 45–117)
ALT SERPL-CCNC: 42 U/L (ref 10–53)
ANION GAP SERPL CALC-SCNC: 10 MEQ/L (ref 5–15)
AST SERPL-CCNC: 30 U/L (ref 15–37)
BILIRUB SERPL-MCNC: 0.7 MG/DL (ref 0.2–1)
BUN SERPL-MCNC: 18 MG/DL (ref 7–18)
CHLORIDE SERPL-SCNC: 108 MEQ/L (ref 98–107)
GFR SERPLBLD BASED ON 1.73 SQ M-ARVRAT: 49 ML/MIN (ref 89–?)
HCO3 BLD-SCNC: 23.3 MEQ/L (ref 21–32)
POTASSIUM SERPL-SCNC: 3.5 MEQ/L (ref 3.5–5.1)
SODIUM SERPL-SCNC: 141 MEQ/L (ref 136–145)

## 2017-07-18 PROCEDURE — 80053 COMPREHEN METABOLIC PANEL: CPT

## 2017-07-18 PROCEDURE — 36415 COLL VENOUS BLD VENIPUNCTURE: CPT

## 2018-01-16 ENCOUNTER — HOSPITAL ENCOUNTER (OUTPATIENT)
Dept: HOSPITAL 17 - PLAB | Age: 73
End: 2018-01-16
Attending: FAMILY MEDICINE
Payer: MEDICARE

## 2018-01-16 DIAGNOSIS — I10: Primary | ICD-10-CM

## 2018-01-16 DIAGNOSIS — E78.2: ICD-10-CM

## 2018-01-16 LAB
ALBUMIN SERPL-MCNC: 4 GM/DL (ref 3.4–5)
ALP SERPL-CCNC: 129 U/L (ref 45–117)
ALT SERPL-CCNC: 37 U/L (ref 10–53)
AST SERPL-CCNC: 35 U/L (ref 15–37)
BILIRUB SERPL-MCNC: 0.5 MG/DL (ref 0.2–1)
BUN SERPL-MCNC: 37 MG/DL (ref 7–18)
CALCIUM SERPL-MCNC: 9.2 MG/DL (ref 8.5–10.1)
CHLORIDE SERPL-SCNC: 105 MEQ/L (ref 98–107)
CHOLEST SERPL-MCNC: 239 MG/DL (ref 120–200)
CHOLESTEROL/ HDL RATIO: 1.95 RATIO
CREAT SERPL-MCNC: 1.38 MG/DL (ref 0.5–1)
GFR SERPLBLD BASED ON 1.73 SQ M-ARVRAT: 37 ML/MIN (ref 89–?)
HCO3 BLD-SCNC: 22.8 MEQ/L (ref 21–32)
HDLC SERPL-MCNC: 122.2 MG/DL (ref 40–60)
LDLC SERPL-MCNC: 102 MG/DL (ref 0–99)
PROT SERPL-MCNC: 7.7 GM/DL (ref 6.4–8.2)
SODIUM SERPL-SCNC: 139 MEQ/L (ref 136–145)
TRIGL SERPL-MCNC: 72 MG/DL (ref 42–150)

## 2018-01-16 PROCEDURE — 80053 COMPREHEN METABOLIC PANEL: CPT

## 2018-01-16 PROCEDURE — 80061 LIPID PANEL: CPT

## 2018-01-16 PROCEDURE — 36415 COLL VENOUS BLD VENIPUNCTURE: CPT

## 2018-03-15 ENCOUNTER — HOSPITAL ENCOUNTER (OUTPATIENT)
Dept: HOSPITAL 17 - PLAB | Age: 73
End: 2018-03-15
Attending: PSYCHIATRY & NEUROLOGY
Payer: MEDICARE

## 2018-03-15 DIAGNOSIS — R79.89: Primary | ICD-10-CM

## 2018-03-15 DIAGNOSIS — E53.8: ICD-10-CM

## 2018-03-15 PROCEDURE — 82607 VITAMIN B-12: CPT

## 2018-03-15 PROCEDURE — 36415 COLL VENOUS BLD VENIPUNCTURE: CPT

## 2018-03-15 PROCEDURE — 84080 ASSAY ALKALINE PHOSPHATASES: CPT

## 2018-03-20 LAB
ALK PHOS BONE (ISOENZYMES): 32 % (ref 28–66)
ALK PHOS LIVER (ISOENZYME): 68 % (ref 25–69)
ALK PHOS PLACENTAL ISOENZYME: 0 %
ALP INTEST CFR SERPL: 0 % (ref 1–24)